# Patient Record
Sex: FEMALE | Race: BLACK OR AFRICAN AMERICAN | Employment: OTHER | URBAN - METROPOLITAN AREA
[De-identification: names, ages, dates, MRNs, and addresses within clinical notes are randomized per-mention and may not be internally consistent; named-entity substitution may affect disease eponyms.]

---

## 2024-04-25 ENCOUNTER — OFFICE VISIT (OUTPATIENT)
Dept: CARDIOLOGY | Facility: CLINIC | Age: 79
End: 2024-04-25
Payer: COMMERCIAL

## 2024-04-25 VITALS — DIASTOLIC BLOOD PRESSURE: 46 MMHG | HEART RATE: 88 BPM | OXYGEN SATURATION: 97 % | SYSTOLIC BLOOD PRESSURE: 65 MMHG

## 2024-04-25 DIAGNOSIS — R94.31 ABNORMAL EKG: Primary | ICD-10-CM

## 2024-04-25 DIAGNOSIS — I50.20 HFREF (HEART FAILURE WITH REDUCED EJECTION FRACTION) (MULTI): ICD-10-CM

## 2024-04-25 DIAGNOSIS — G47.33 OBSTRUCTIVE SLEEP APNEA: ICD-10-CM

## 2024-04-25 DIAGNOSIS — Z95.810 ICD (IMPLANTABLE CARDIOVERTER-DEFIBRILLATOR) IN PLACE: ICD-10-CM

## 2024-04-25 PROCEDURE — 99204 OFFICE O/P NEW MOD 45 MIN: CPT | Performed by: INTERNAL MEDICINE

## 2024-04-25 PROCEDURE — 1159F MED LIST DOCD IN RCRD: CPT | Performed by: INTERNAL MEDICINE

## 2024-04-25 PROCEDURE — 93000 ELECTROCARDIOGRAM COMPLETE: CPT | Performed by: INTERNAL MEDICINE

## 2024-04-25 PROCEDURE — 1036F TOBACCO NON-USER: CPT | Performed by: INTERNAL MEDICINE

## 2024-04-25 RX ORDER — TRAZODONE HYDROCHLORIDE 50 MG/1
25 TABLET ORAL NIGHTLY
COMMUNITY
Start: 2024-04-16

## 2024-04-25 RX ORDER — NYSTATIN 100000 [USP'U]/G
POWDER TOPICAL
COMMUNITY
Start: 2024-04-16

## 2024-04-25 RX ORDER — CARVEDILOL 12.5 MG/1
18.75 TABLET ORAL
COMMUNITY
Start: 2024-04-05

## 2024-04-25 RX ORDER — INSULIN ASPART 100 [IU]/ML
8 INJECTION, SOLUTION INTRAVENOUS; SUBCUTANEOUS
COMMUNITY

## 2024-04-25 RX ORDER — TRAMADOL HYDROCHLORIDE 50 MG/1
50 TABLET ORAL 2 TIMES DAILY
COMMUNITY
Start: 2024-04-17

## 2024-04-25 RX ORDER — ALLOPURINOL 100 MG/1
100 TABLET ORAL DAILY
COMMUNITY
Start: 2024-04-05

## 2024-04-25 RX ORDER — ASPIRIN 81 MG/1
81 TABLET ORAL DAILY
COMMUNITY

## 2024-04-25 RX ORDER — ISOSORBIDE DINITRATE 10 MG/1
10 TABLET ORAL 2 TIMES DAILY
COMMUNITY
Start: 2024-04-16

## 2024-04-25 RX ORDER — OMEPRAZOLE 20 MG/1
20 TABLET, DELAYED RELEASE ORAL
COMMUNITY

## 2024-04-25 RX ORDER — MELATONIN 3 MG
CAPSULE ORAL
COMMUNITY

## 2024-04-25 RX ORDER — BISMUTH SUBSALICYLATE 262 MG
1 TABLET,CHEWABLE ORAL DAILY
COMMUNITY

## 2024-04-25 RX ORDER — ADHESIVE BANDAGE
30 BANDAGE TOPICAL DAILY PRN
COMMUNITY

## 2024-04-25 RX ORDER — TORSEMIDE 20 MG/1
20 TABLET ORAL DAILY
COMMUNITY
Start: 2024-04-16

## 2024-04-25 RX ORDER — ONDANSETRON 4 MG/1
TABLET, FILM COATED ORAL
COMMUNITY
Start: 2024-04-16

## 2024-04-25 RX ORDER — ATORVASTATIN CALCIUM 80 MG/1
TABLET, FILM COATED ORAL
COMMUNITY
Start: 2024-04-16

## 2024-04-25 RX ORDER — AMMONIUM LACTATE 12 G/100G
CREAM TOPICAL
COMMUNITY
Start: 2024-04-16

## 2024-04-25 RX ORDER — CITALOPRAM 20 MG/1
10 TABLET, FILM COATED ORAL DAILY
COMMUNITY
Start: 2024-04-16

## 2024-04-25 RX ORDER — HYDRALAZINE HYDROCHLORIDE 10 MG/1
10 TABLET, FILM COATED ORAL 2 TIMES DAILY
COMMUNITY

## 2024-04-25 RX ORDER — FOLIC ACID 1 MG/1
1 TABLET ORAL DAILY
COMMUNITY

## 2024-04-25 RX ORDER — BISACODYL 10 MG/1
10 SUPPOSITORY RECTAL DAILY PRN
COMMUNITY

## 2024-04-25 RX ORDER — ACETAMINOPHEN 500 MG
500 TABLET ORAL 2 TIMES DAILY
COMMUNITY

## 2024-04-25 RX ORDER — EMPAGLIFLOZIN 10 MG/1
10 TABLET, FILM COATED ORAL DAILY
COMMUNITY
Start: 2024-04-16

## 2024-04-25 RX ORDER — SENNOSIDES 8.6 MG/1
1 TABLET ORAL 2 TIMES DAILY
COMMUNITY

## 2024-04-25 RX ORDER — ALBUTEROL SULFATE 0.83 MG/ML
2.5 SOLUTION RESPIRATORY (INHALATION) EVERY 4 HOURS PRN
COMMUNITY
Start: 2024-04-16

## 2024-04-25 RX ORDER — ASCORBIC ACID 125 MG
TABLET,CHEWABLE ORAL
COMMUNITY

## 2024-04-25 RX ORDER — POLYETHYLENE GLYCOL 3350 17 G/17G
17 POWDER, FOR SOLUTION ORAL DAILY
COMMUNITY

## 2024-04-25 RX ORDER — FAMOTIDINE 20 MG/1
20 TABLET, FILM COATED ORAL DAILY
COMMUNITY

## 2024-04-25 RX ORDER — INSULIN GLARGINE 100 [IU]/ML
18 INJECTION, SOLUTION SUBCUTANEOUS NIGHTLY
COMMUNITY

## 2024-04-25 RX ORDER — CITALOPRAM 10 MG/1
10 TABLET ORAL DAILY
COMMUNITY
Start: 2024-04-05

## 2024-04-25 RX ORDER — DOCUSATE SODIUM 100 MG/1
100 CAPSULE, LIQUID FILLED ORAL 2 TIMES DAILY
COMMUNITY

## 2024-04-25 NOTE — PATIENT INSTRUCTIONS
Increase torsemide to 40 mg daily for edema  See your cardiologist at [facility]

## 2024-04-25 NOTE — PROGRESS NOTES
Referred by Dr. Puente ref. provider found for New Patient Visit     History Of Present Illness:    Belen Peng is a 78 y.o. female with obesity, obstructive sleep apnea, hypertension, heart failure with reduced ejection fraction, AICD in place,diabetes mellitus presenting for cardiac evaluation.  She is bedridden for 9 years  Her records are all with [facility]-has had extensive cardiac testing  Was recently hospitalized at  with SOB    Now on oxygen and breathing better  LLE swollen  No CP/palpitations/syncope      Past Medical History:  HFREF  DM  ELIZA  HTN  Obesity  CRI    Past Surgical History:  AICD      Social History:  She reports that she has never smoked. She has never used smokeless tobacco. No history on file for alcohol use and drug use.  Resides in [place]  Family History:  Non contributory   Allergies:  Codeine and Opioids-meperidine and related    Outpatient Medications:  Current Outpatient Medications   Medication Instructions    acetaminophen (TYLENOL) 500 mg, oral, 2 times daily    albuterol 2.5 mg, nebulization, Every 4 hours PRN    allopurinol (ZYLOPRIM) 100 mg, oral, Daily    ammonium lactate (Amlactin) 12 % cream     aspirin 81 mg, oral, Daily    atorvastatin (Lipitor) 80 mg tablet     bisacodyl (DULCOLAX (BISACODYL)) 10 mg, rectal, Daily PRN    bisacodyl (FLEET BISACODYL) 10 mg, rectal, Once    carvedilol (COREG) 18.75 mg, oral, 2 times daily with meals    cholecalciferol, vitamin D3, 25 mcg (1,000 unit) tablet,chewable oral    citalopram (CELEXA) 10 mg, oral, Daily    citalopram (CELEXA) 10 mg, oral, Daily    docusate sodium (COLACE) 100 mg, oral, 2 times daily    famotidine (PEPCID) 20 mg, oral, Daily    folic acid (FOLVITE) 1 mg, oral, Daily    hydrALAZINE (APRESOLINE) 10 mg, oral, 2 times daily    insulin aspart (NOVOLOG U-100 INSULIN ASPART) 8 Units, subcutaneous, 3 times daily with meals, Take as directed per insulin instructions.    isosorbide dinitrate (ISORDIL) 10 mg, oral, 2 times daily     Jardiance 10 mg, oral, Daily    Lantus U-100 Insulin 18 Units, subcutaneous, Nightly, Take as directed per insulin instructions.    magnesium hydroxide (Milk of Magnesia) 400 mg/5 mL suspension 30 mL, oral, Daily PRN    melatonin 3 mg capsule oral    multivitamin tablet 1 tablet, oral, Daily    Nyamyc 100,000 unit/gram powder     omeprazole OTC (PRILOSEC OTC) 20 mg, oral, Daily before breakfast, Do not crush, chew, or split.    ondansetron (Zofran) 4 mg tablet     polyethylene glycol (GLYCOLAX, MIRALAX) 17 g, oral, Daily    sennosides (Senokot) 8.6 mg tablet 1 tablet, oral, 2 times daily    torsemide (DEMADEX) 20 mg, oral, Daily    traMADol (ULTRAM) 50 mg, oral, 2 times daily    traZODone (DESYREL) 25 mg, oral, Nightly    zinc oxide-white petrolatum 10-78 % cream topical (top)        Last Recorded Vitals:  Vitals:    04/25/24 1207   BP: (!) 65/46   BP Location: Right arm   Patient Position: Sitting   BP Cuff Size: Large adult   Pulse: 88   SpO2: 97%       Physical Exam:  Constitutional:       General: Awake.      Appearance: Not in distress. Obese and frail. Chronically ill-appearing.   Neck:      Vascular: No JVR. JVD normal.   Pulmonary:      Effort: Pulmonary effort is normal.      Breath sounds: Normal breath sounds. No wheezing. No rhonchi. No rales.   Chest:      Chest wall: Not tender to palpatation.   Cardiovascular:      PMI at left midclavicular line. Normal rate. Regular rhythm. Normal S1. Normal S2.       Murmurs: There is no murmur.      No gallop.  No click. No rub.   Pulses:     Intact distal pulses.   Edema:     Peripheral edema present.     Thigh: 1+ edema of the right thigh.     Pretibial: 4+ pitting edema of the left pretibial area and 1+ pitting edema of the right pretibial area.     Ankle: 4+ pitting edema of the left ankle and 1+ pitting edema of the right ankle.     Feet: 4+ pitting edema of the left foot and 1+ pitting edema of the right foot.  Abdominal:      General: Abdomen is protuberant.  "Bowel sounds are normal.      Palpations: Abdomen is soft.      Tenderness: There is no abdominal tenderness.   Musculoskeletal: Normal range of motion.         General: No tenderness.      Comments: bedridden Skin:     General: Skin is warm and dry.   Neurological:      General: No focal deficit present.      Mental Status: Alert and oriented to person, place and time.            Last Labs:  CBC -  No results found for: \"WBC\", \"HGB\", \"HCT\", \"MCV\", \"PLT\"    CMP -  No results found for: \"CALCIUM\", \"PHOS\", \"PROT\", \"ALBUMIN\", \"AST\", \"ALT\", \"ALKPHOS\", \"BILITOT\"    LIPID PANEL -   No results found for: \"CHOL\", \"TRIG\", \"HDL\", \"CHHDL\", \"LDLF\", \"VLDL\", \"NHDL\"    RENAL FUNCTION PANEL -   No results found for: \"GLUCOSE\", \"NA\", \"K\", \"CL\", \"CO2\", \"ANIONGAP\", \"BUN\", \"CREATININE\", \"GFRMALE\", \"CALCIUM\", \"PHOS\", \"ALBUMIN\"     No results found for: \"BNP\", \"HGBA1C\"    Last Cardiology Tests:  ECG:  Normal sinus rhythm with premature atrial contractions  Right axis deviation  Nonspecific intraventricular conduction delay  ST and T wave abnormality consider inferolateral ischemia      Echo:  No results found for this or any previous visit from the past 1095 days.      Ejection Fractions:  No results found for: \"EF\"    Cath:  No results found for this or any previous visit from the past 1095 days.      Stress Test:  No results found for this or any previous visit from the past 1095 days.      Cardiac Imaging:  No results found for this or any previous visit from the past 1095 days.        Lab review: I personally reviewed the labs    Assessment/Plan   Problem List Items Addressed This Visit       HFrEF (heart failure with reduced ejection fraction) (Multi)    Overview     Patient apparently has a long history of LV systolic dysfunction-questionably nonischemic  She is likely not on an ACE or ARB or Arni because of renal insufficiency-she is on hydralazine and Imdur and carvedilol and Jardiance  She does have marked lower extremity edema " therefore we will titrate up her torsemide         Relevant Medications    carvedilol (Coreg) 12.5 mg tablet    isosorbide dinitrate (Isordil) 10 mg tablet    Abnormal EKG - Primary    Overview     Likely related to underlying cardiomyopathy  No comparison available           Relevant Orders    ECG 12 Lead (Completed)    Follow Up In Cardiology    ICD (implantable cardioverter-defibrillator) in place    Overview     Would suggest follow-up at the [facility] where device was placed-she does not know what type of device she has her have a card         Obstructive sleep apnea    Overview     On CPAP and oxygen             Suggest following up with her [facility] cardiologist     Suman Javed DO